# Patient Record
Sex: MALE | Race: WHITE | NOT HISPANIC OR LATINO | Employment: UNEMPLOYED | URBAN - METROPOLITAN AREA
[De-identification: names, ages, dates, MRNs, and addresses within clinical notes are randomized per-mention and may not be internally consistent; named-entity substitution may affect disease eponyms.]

---
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
## 2025-05-13 ENCOUNTER — OFFICE VISIT (OUTPATIENT)
Dept: INTERNAL MEDICINE | Facility: CLINIC | Age: 20
End: 2025-05-13
Payer: OTHER GOVERNMENT

## 2025-05-13 VITALS
OXYGEN SATURATION: 98 % | WEIGHT: 231.8 LBS | SYSTOLIC BLOOD PRESSURE: 130 MMHG | RESPIRATION RATE: 12 BRPM | DIASTOLIC BLOOD PRESSURE: 70 MMHG | HEART RATE: 95 BPM | TEMPERATURE: 98 F | HEIGHT: 75 IN | BODY MASS INDEX: 28.82 KG/M2

## 2025-05-13 DIAGNOSIS — Z13.220 LIPID SCREENING: ICD-10-CM

## 2025-05-13 DIAGNOSIS — Z23 NEED FOR VACCINATION: ICD-10-CM

## 2025-05-13 DIAGNOSIS — R19.4 ALTERED BOWEL HABITS: ICD-10-CM

## 2025-05-13 DIAGNOSIS — R19.7 INTERMITTENT DIARRHEA: ICD-10-CM

## 2025-05-13 DIAGNOSIS — R10.9 ABDOMINAL CRAMPING: ICD-10-CM

## 2025-05-13 DIAGNOSIS — I87.8 PELVIC PHLEBOLITHIASIS: ICD-10-CM

## 2025-05-13 DIAGNOSIS — Z13.29 THYROID DISORDER SCREEN: ICD-10-CM

## 2025-05-13 DIAGNOSIS — Z00.00 ANNUAL PHYSICAL EXAM: Primary | ICD-10-CM

## 2025-05-13 DIAGNOSIS — K59.09 INTERMITTENT CONSTIPATION: ICD-10-CM

## 2025-05-13 DIAGNOSIS — Z11.59 ENCOUNTER FOR HEPATITIS C SCREENING TEST FOR LOW RISK PATIENT: ICD-10-CM

## 2025-05-13 LAB
ALBUMIN SERPL-MCNC: 4.6 G/DL (ref 3.5–5.2)
ALBUMIN/GLOB SERPL: 1.8 G/DL
ALP SERPL-CCNC: 95 U/L (ref 39–117)
ALT SERPL W P-5'-P-CCNC: 145 U/L (ref 1–41)
AMYLASE SERPL-CCNC: 33 U/L (ref 28–100)
ANION GAP SERPL CALCULATED.3IONS-SCNC: 9.4 MMOL/L (ref 5–15)
AST SERPL-CCNC: 156 U/L (ref 1–40)
BASOPHILS # BLD AUTO: 0.02 10*3/MM3 (ref 0–0.2)
BASOPHILS NFR BLD AUTO: 0.4 % (ref 0–1.5)
BILIRUB SERPL-MCNC: 0.7 MG/DL (ref 0–1.2)
BUN SERPL-MCNC: 15 MG/DL (ref 6–20)
BUN/CREAT SERPL: 16.1 (ref 7–25)
CALCIUM SPEC-SCNC: 9.1 MG/DL (ref 8.6–10.5)
CHLORIDE SERPL-SCNC: 101 MMOL/L (ref 98–107)
CHOLEST SERPL-MCNC: 97 MG/DL (ref 0–200)
CO2 SERPL-SCNC: 24.6 MMOL/L (ref 22–29)
CREAT SERPL-MCNC: 0.93 MG/DL (ref 0.76–1.27)
DEPRECATED RDW RBC AUTO: 38.1 FL (ref 37–54)
EGFRCR SERPLBLD CKD-EPI 2021: 121.3 ML/MIN/1.73
EOSINOPHIL # BLD AUTO: 0.08 10*3/MM3 (ref 0–0.4)
EOSINOPHIL NFR BLD AUTO: 1.7 % (ref 0.3–6.2)
ERYTHROCYTE [DISTWIDTH] IN BLOOD BY AUTOMATED COUNT: 12.6 % (ref 12.3–15.4)
GLOBULIN UR ELPH-MCNC: 2.6 GM/DL
GLUCOSE SERPL-MCNC: 88 MG/DL (ref 65–99)
HCT VFR BLD AUTO: 42.9 % (ref 37.5–51)
HCV AB SER QL: NORMAL
HDLC SERPL-MCNC: 44 MG/DL (ref 40–60)
HGB BLD-MCNC: 14.8 G/DL (ref 13–17.7)
IMM GRANULOCYTES # BLD AUTO: 0.01 10*3/MM3 (ref 0–0.05)
IMM GRANULOCYTES NFR BLD AUTO: 0.2 % (ref 0–0.5)
LDLC SERPL CALC-MCNC: 43 MG/DL (ref 0–100)
LDLC/HDLC SERPL: 1.06 {RATIO}
LIPASE SERPL-CCNC: 26 U/L (ref 13–60)
LYMPHOCYTES # BLD AUTO: 1.36 10*3/MM3 (ref 0.7–3.1)
LYMPHOCYTES NFR BLD AUTO: 28.3 % (ref 19.6–45.3)
MCH RBC QN AUTO: 28.6 PG (ref 26.6–33)
MCHC RBC AUTO-ENTMCNC: 34.5 G/DL (ref 31.5–35.7)
MCV RBC AUTO: 83 FL (ref 79–97)
MONOCYTES # BLD AUTO: 0.54 10*3/MM3 (ref 0.1–0.9)
MONOCYTES NFR BLD AUTO: 11.3 % (ref 5–12)
NEUTROPHILS NFR BLD AUTO: 2.79 10*3/MM3 (ref 1.7–7)
NEUTROPHILS NFR BLD AUTO: 58.1 % (ref 42.7–76)
NRBC BLD AUTO-RTO: 0 /100 WBC (ref 0–0.2)
PLATELET # BLD AUTO: 282 10*3/MM3 (ref 140–450)
PMV BLD AUTO: 9.8 FL (ref 6–12)
POTASSIUM SERPL-SCNC: 4 MMOL/L (ref 3.5–5.2)
PROT SERPL-MCNC: 7.2 G/DL (ref 6–8.5)
RBC # BLD AUTO: 5.17 10*6/MM3 (ref 4.14–5.8)
SODIUM SERPL-SCNC: 135 MMOL/L (ref 136–145)
TRIGL SERPL-MCNC: 31 MG/DL (ref 0–150)
TSH SERPL DL<=0.05 MIU/L-ACNC: 2.87 UIU/ML (ref 0.27–4.2)
VLDLC SERPL-MCNC: 10 MG/DL (ref 5–40)
WBC NRBC COR # BLD AUTO: 4.8 10*3/MM3 (ref 3.4–10.8)

## 2025-05-13 PROCEDURE — 82150 ASSAY OF AMYLASE: CPT | Performed by: NURSE PRACTITIONER

## 2025-05-13 PROCEDURE — 80053 COMPREHEN METABOLIC PANEL: CPT | Performed by: NURSE PRACTITIONER

## 2025-05-13 PROCEDURE — 80061 LIPID PANEL: CPT | Performed by: NURSE PRACTITIONER

## 2025-05-13 PROCEDURE — 86803 HEPATITIS C AB TEST: CPT | Performed by: NURSE PRACTITIONER

## 2025-05-13 PROCEDURE — 83690 ASSAY OF LIPASE: CPT | Performed by: NURSE PRACTITIONER

## 2025-05-13 PROCEDURE — 84443 ASSAY THYROID STIM HORMONE: CPT | Performed by: NURSE PRACTITIONER

## 2025-05-13 PROCEDURE — 85025 COMPLETE CBC W/AUTO DIFF WBC: CPT | Performed by: NURSE PRACTITIONER

## 2025-05-13 NOTE — PROGRESS NOTES
"Chief Complaint  Abdominal Pain (Patient stated that he used to smoke and drink and every since then the abdominal pain has decreased but the pain is still there. Patient stated after they eat he has to use the restroom right away. Patient stated that they think that might have IBS)    Subjective        Dustin Gagnon presents to [facility] Pediatrics for checkup and some acute concerns.    Patient has not had a checkup since 2022.  He is here today primarily because of abdominal discomfort.  Patient reports that over the last several months to 1 year he has been experiencing some abdominal cramping, bubbling.  He has very altered bowels throughout the year, he can be normal, loose, watery, constipated, seems to be more stress related, concern for possible IBS.  He does report some intermittent abdominal discomfort, but it is really just a altered bowel habits.  It is affecting his job, if he is unable to make it to the bathroom, this gets worse and gets quite stressful for him.    Objective   Vital Signs:   /70 (BP Location: Left arm, Patient Position: Sitting, Cuff Size: Adult)   Pulse 95   Temp 98 °F (36.7 °C) (Temporal)   Resp 12   Ht 190.5 cm (75\")   Wt 105 kg (231 lb 12.8 oz)   SpO2 98%   BMI 28.97 kg/m²     Physical Exam  Vitals and nursing note reviewed.   Constitutional:       Appearance: Normal appearance.   HENT:      Head: Normocephalic and atraumatic.      Right Ear: External ear normal.      Left Ear: External ear normal.      Nose: Nose normal.      Mouth/Throat:      Mouth: Mucous membranes are moist.   Eyes:      Pupils: Pupils are equal, round, and reactive to light.   Cardiovascular:      Rate and Rhythm: Normal rate and regular rhythm.   Pulmonary:      Effort: Pulmonary effort is normal.      Breath sounds: Normal breath sounds.   Abdominal:      General: Bowel sounds are normal.   Neurological:      Mental Status: He is alert.   Psychiatric:       "   Mood and Affect: Mood normal.         Thought Content: Thought content normal.        Result Review :  {The following data was reviewed by ROBERT Copeland on 05/13/25            XR Abdomen KUB (In Office)  Result Date: 5/13/2025  Narrative: XR ABDOMEN KUB Date of Exam: 5/13/2025 9:07 AM EDT Indication: abd cramping Comparison: None available. Findings: 2 supine views of the abdomen demonstrates motion artifact in the upper abdomen view. No bowel obstruction is seen. Normal volume of stool. No acute osseous findings. No abnormal calcifications. Several pelvic calcifications may reflect phleboliths.     Impression: Impression: Nonobstructive bowel gas pattern. Several pelvic calcifications may reflect phleboliths. Electronically Signed: Jhonathan Arenas MD  5/13/2025 9:28 AM EDT  Workstation ID: [id number]        Diagnoses and all orders for this visit:    1. Annual physical exam (Primary)  Assessment & Plan:  Screening labs reviewed/ordered  Counseling provided regarding age appropriate screenings and immunizations, healthy diet and exercise.       Orders:  -     Comprehensive Metabolic Panel  -     CBC & Differential  -     TSH  -     Lipid Panel    2. Need for vaccination  -     HPV Vaccine    3. Lipid screening  -     Lipid Panel    4. Thyroid disorder screen  -     TSH    5. Abdominal cramping  -     Comprehensive Metabolic Panel  -     CBC & Differential  -     Lipase  -     Amylase  -     Ambulatory Referral to Gastroenterology  -     XR Abdomen KUB (In Office)  -     CT Abdomen Pelvis With & Without Contrast; Future    6. Intermittent diarrhea  -     Comprehensive Metabolic Panel  -     CBC & Differential  -     Amylase  -     Ambulatory Referral to Gastroenterology  -     XR Abdomen KUB (In Office)  -     CT Abdomen Pelvis With & Without Contrast; Future    7. Intermittent constipation  -     Comprehensive Metabolic Panel  -     CBC & Differential  -     Amylase  -     Ambulatory Referral to  Gastroenterology  -     XR Abdomen KUB (In Office)  -     CT Abdomen Pelvis With & Without Contrast; Future    8. Encounter for hepatitis C screening test for low risk patient  -     Hepatitis C Antibody    9. Pelvic phlebolithiasis  -     CT Abdomen Pelvis With & Without Contrast; Future    10. Altered bowel habits  -     CT Abdomen Pelvis With & Without Contrast; Future          Follow Up   No follow-ups on file.  Patient was given instructions and counseling regarding his condition or for health maintenance advice. Please see specific information pulled into the AVS if appropriate.     Gaetano Fairchild, ROBERT  5/13/2025  This note was electronically signed.

## 2025-05-13 NOTE — LETTER
[facility]  Vaccine Consent Form    Patient Name:  Dustin Infanteerling  Patient :  2005     Vaccine(s) Ordered    HPV Vaccine        Screening Checklist  The following questions should be completed prior to vaccination. If you answer “yes” to any question, it does not necessarily mean you should not be vaccinated. It just means we may need to clarify or ask more questions. If a question is unclear, please ask your healthcare provider to explain it.    Yes No   Any fever or moderate to severe illness today (mild illness and/or antibiotic treatment are not contraindications)?     Do you have a history of a serious reaction to any previous vaccinations, such as anaphylaxis, encephalopathy within 7 days, Guillain-[place] syndrome within 6 weeks, seizure?     Have you received any live vaccine(s) (e.g MMR, ROMI) or any other vaccines in the last month (to ensure duplicate doses aren't given)?     Do you have an anaphylactic allergy to latex (DTaP, DTaP-IPV, Hep A, Hep B, MenB, RV, Td, Tdap), baker’s yeast (Hep B, HPV), polysorbates (RSV, nirsevimab, PCV 20, Rotavirrus, Tdap, Shingrix), or gelatin (ROMI, MMR)?     Do you have an anaphylactic allergy to neomycin (Rabies, ROMI, MMR, IPV, Hep A), polymyxin B (IPV), or streptomycin (IPV)?      Any cancer, leukemia, AIDS, or other immune system disorder? (ROMI, MMR, RV)     Do you have a parent, brother, or sister with an immune system problem (if immune competence of vaccine recipient clinically verified, can proceed)? (MMR, ROMI)     Any recent steroid treatments for >2 weeks, chemotherapy, or radiation treatment? (ROMI, MMR)     Have you received antibody-containing blood transfusions or IVIG in the past 11 months (recommended interval is dependent on product)? (MMR, ROMI)     Have you taken antiviral drugs (acyclovir, famciclovir, valacyclovir for ROMI) in the last 24 or 48 hours, respectively?      Are you pregnant or planning to become pregnant within 1  "month? (ROMI, MMR, HPV, IPV, MenB, Abrexvy; For Hep B- refer to Engerix-B; For RSV - Abrysvo is indicated for 32-36 weeks of pregnancy from September to January)     For infants, have you ever been told your child has had intussusception or a medical emergency involving obstruction of the intestine (Rotavirus)? If not for an infant, can skip this question.         *Ordering Physicians/APC should be consulted if \"yes\" is checked by the patient or guardian above.  I have received, read, and understand the Vaccine Information Statement (VIS) for each vaccine ordered.  I have considered my or my child's health status as well as the health status of my close contacts.  I have taken the opportunity to discuss my vaccine questions with my or my child's health care provider.   I have requested that the ordered vaccine(s) be given to me or my child.  I understand the benefits and risks of the vaccines.  I understand that I should remain in the clinic for 15 minutes after receiving the vaccine(s).  _________________________________________________________  Signature of Patient or Parent/Legal Guardian ____________________  Date     "

## 2025-05-15 ENCOUNTER — OFFICE VISIT (OUTPATIENT)
Dept: GASTROENTEROLOGY | Facility: CLINIC | Age: 20
End: 2025-05-15
Payer: OTHER GOVERNMENT

## 2025-05-15 VITALS
SYSTOLIC BLOOD PRESSURE: 146 MMHG | BODY MASS INDEX: 28.85 KG/M2 | DIASTOLIC BLOOD PRESSURE: 60 MMHG | HEIGHT: 75 IN | OXYGEN SATURATION: 100 % | HEART RATE: 60 BPM | WEIGHT: 232 LBS

## 2025-05-15 DIAGNOSIS — R19.7 DIARRHEA, UNSPECIFIED TYPE: ICD-10-CM

## 2025-05-15 DIAGNOSIS — R79.89 ELEVATED LIVER FUNCTION TESTS: ICD-10-CM

## 2025-05-15 DIAGNOSIS — F10.90 ALCOHOL USE: ICD-10-CM

## 2025-05-15 DIAGNOSIS — K59.00 CONSTIPATION, UNSPECIFIED CONSTIPATION TYPE: ICD-10-CM

## 2025-05-15 DIAGNOSIS — R19.4 ALTERED BOWEL HABITS: Primary | ICD-10-CM

## 2025-05-15 NOTE — PROGRESS NOTES
Chief Complaint  Constipation and Diarrhea    Dustin Gagnon is a 19 y.o. male who presents to [facility]- [facility] on referral from No ref. provider found for a gastroenterology evaluation of altered bowel habits.      History of Present Illness  New patient presents to the office for elevated liver enzymes and altered bowel habits.  Patient reports a history of binge drinking in excess specifically on the weekends.  Patient feels that alcohol consumption in the last couple months has been excessive.  Last drink of alcohol was 5/10/2025.  During this timeframe he also had noticed change in bowel habits to alternate between constipation and diarrhea.  Denies melena and hematochezia.  Since stopping alcohol and increasing activity and water consumption he has had better regulation of bowel habits.      Past Medical History:   Diagnosis Date    ADHD (attention deficit hyperactivity disorder)        Past Surgical History:   Procedure Laterality Date    EAR TUBES         No current outpatient medications on file.     No Known Allergies    Family History   Problem Relation Age of Onset    Cancer Neg Hx     Diabetes Neg Hx     Hearing loss Neg Hx     Stroke Neg Hx     Colon cancer Neg Hx         Social History     Social History Narrative    Not on file       Immunization:  Immunization History   Administered Date(s) Administered    DTaP 2005, 2005, 2005, 01/29/2007, 07/08/2009    Fluzone (or Fluarix & Flulaval for VFC) >6mos 10/11/2022    Hep A, 2 Dose 05/07/2012, 12/28/2012    Hep B, Adolescent or Pediatric 2005, 2005, 2005    Hib (PRP-T) 2005, 2005, 2005, 06/27/2006    Hpv9 10/15/2019, 05/13/2025    IPV 2005, 2005, 2005, 07/08/2009    Influenza TIV (IM) 10/15/2019    MMR 06/27/2006, 07/08/2009    Meningococcal MCV4P (Menactra) 07/07/2016    Pneumococcal Conjugate 13-Valent (PCV13) 2005,  "2005, 2005, 09/27/2006    Tdap 07/07/2016, 11/29/2023    Varicella 06/27/2006, 07/08/2009        Objective     Vital Signs:   /60 (BP Location: Left arm, Patient Position: Sitting, Cuff Size: Adult)   Pulse 60   Ht 190.5 cm (75\")   Wt 105 kg (232 lb)   SpO2 100%   BMI 29.00 kg/m²       Physical Exam  Constitutional:       Appearance: Normal appearance. He is normal weight.   HENT:      Head: Normocephalic and atraumatic.      Nose: Nose normal.   Pulmonary:      Effort: Pulmonary effort is normal.   Skin:     General: Skin is warm and dry.   Neurological:      Mental Status: He is alert and oriented to person, place, and time. Mental status is at baseline.   Psychiatric:         Mood and Affect: Mood normal.         Behavior: Behavior normal.         Thought Content: Thought content normal.         Judgment: Judgment normal.         Result Review :     CBC w/diff          5/13/2025    09:23   CBC w/Diff   WBC 4.80    RBC 5.17    Hemoglobin 14.8    Hematocrit 42.9    MCV 83.0    MCH 28.6    MCHC 34.5    RDW 12.6    Platelets 282    Neutrophil Rel % 58.1    Immature Granulocyte Rel % 0.2    Lymphocyte Rel % 28.3    Monocyte Rel % 11.3    Eosinophil Rel % 1.7    Basophil Rel % 0.4      CMP          5/13/2025    09:23   CMP   Glucose 88    BUN 15    Creatinine 0.93    EGFR 121.3    Sodium 135    Potassium 4.0    Chloride 101    Calcium 9.1    Total Protein 7.2    Albumin 4.6    Globulin 2.6    Total Bilirubin 0.7    Alkaline Phosphatase 95    AST (SGOT) 156    ALT (SGPT) 145    Albumin/Globulin Ratio 1.8    BUN/Creatinine Ratio 16.1    Anion Gap 9.4        Lipase   Lipase   Date Value Ref Range Status   05/13/2025 26 13 - 60 U/L Final     Amylase   Amylase   Date Value Ref Range Status   05/13/2025 33 28 - 100 U/L Final     Iron Profile No results found for: \"IRON\", \"TIBC\", \"LABIRON\", \"TRANSFERRIN\"  Ferritin No results found for: \"FERRITIN\"        Assessment and Plan    Diagnoses and all orders " for this visit:    1. Altered bowel habits (Primary)    2. Constipation, unspecified constipation type    3. Diarrhea, unspecified type    4. Elevated liver function tests  -     Hepatic Function Panel; Future  -     Protime-INR; Future    5. Alcohol use    Scheduling number provided to patient to proceed with CT scan ordered by PCP.  Plan for repeat liver enzymes in 1 week-if still elevated will consider full liver workup  Patient's GI symptoms seem to be improving with cessation of alcohol as well as increased activity.  Plan to defer colonoscopy at this time for evaluation since symptoms seem to be improving.  Patient will follow-up in about 3 months and if still struggling with bowel habits we will consider colonoscopy at that time.  Patient will call the office if symptoms return.    Follow Up   Return in about 3 months (around 8/15/2025).  Patient was given instructions and counseling regarding his condition or for health maintenance advice. Please see specific information pulled into the AVS if appropriate.

## 2025-05-22 ENCOUNTER — LAB (OUTPATIENT)
Dept: LAB | Facility: HOSPITAL | Age: 20
End: 2025-05-22
Payer: OTHER GOVERNMENT

## 2025-05-22 ENCOUNTER — HOSPITAL ENCOUNTER (OUTPATIENT)
Dept: CT IMAGING | Facility: HOSPITAL | Age: 20
Discharge: HOME | End: 2025-05-22
Payer: OTHER GOVERNMENT

## 2025-05-22 DIAGNOSIS — R10.9 ABDOMINAL CRAMPING: ICD-10-CM

## 2025-05-22 DIAGNOSIS — K59.09 INTERMITTENT CONSTIPATION: ICD-10-CM

## 2025-05-22 DIAGNOSIS — I87.8 PELVIC PHLEBOLITHIASIS: ICD-10-CM

## 2025-05-22 DIAGNOSIS — R19.4 ALTERED BOWEL HABITS: ICD-10-CM

## 2025-05-22 DIAGNOSIS — R79.89 ELEVATED LIVER FUNCTION TESTS: ICD-10-CM

## 2025-05-22 DIAGNOSIS — R19.7 INTERMITTENT DIARRHEA: ICD-10-CM

## 2025-05-22 LAB
ALBUMIN SERPL-MCNC: 4.8 G/DL (ref 3.5–5.2)
ALP SERPL-CCNC: 92 U/L (ref 39–117)
ALT SERPL W P-5'-P-CCNC: 27 U/L (ref 1–41)
AST SERPL-CCNC: 22 U/L (ref 1–40)
BILIRUB CONJ SERPL-MCNC: 0.3 MG/DL (ref 0–0.3)
BILIRUB INDIRECT SERPL-MCNC: 0.9 MG/DL
BILIRUB SERPL-MCNC: 1.2 MG/DL (ref 0–1.2)
INR PPP: 1.2 (ref 0.86–1.15)
PROT SERPL-MCNC: 7.3 G/DL (ref 6–8.5)
PROTHROMBIN TIME: 15.7 SECONDS (ref 11.8–14.9)

## 2025-05-22 PROCEDURE — 74177 CT ABD & PELVIS W/CONTRAST: CPT

## 2025-05-22 PROCEDURE — 25510000001 IOPAMIDOL PER 1 ML: Performed by: NURSE PRACTITIONER

## 2025-05-22 PROCEDURE — 36415 COLL VENOUS BLD VENIPUNCTURE: CPT

## 2025-05-22 PROCEDURE — 85610 PROTHROMBIN TIME: CPT

## 2025-05-22 PROCEDURE — 80076 HEPATIC FUNCTION PANEL: CPT

## 2025-05-22 RX ORDER — IOPAMIDOL 755 MG/ML
100 INJECTION, SOLUTION INTRAVASCULAR
Status: COMPLETED | OUTPATIENT
Start: 2025-05-22 | End: 2025-05-22

## 2025-05-22 RX ADMIN — IOPAMIDOL 100 ML: 755 INJECTION, SOLUTION INTRAVENOUS at 09:05

## 2025-08-15 ENCOUNTER — OFFICE VISIT (OUTPATIENT)
Dept: GASTROENTEROLOGY | Facility: CLINIC | Age: 20
End: 2025-08-15
Payer: OTHER GOVERNMENT

## 2025-08-15 VITALS
HEIGHT: 76 IN | DIASTOLIC BLOOD PRESSURE: 63 MMHG | WEIGHT: 225 LBS | OXYGEN SATURATION: 100 % | SYSTOLIC BLOOD PRESSURE: 145 MMHG | HEART RATE: 71 BPM | BODY MASS INDEX: 27.4 KG/M2

## 2025-08-15 DIAGNOSIS — R74.8 ELEVATED LIVER ENZYMES: Primary | ICD-10-CM

## 2025-08-15 PROCEDURE — 99213 OFFICE O/P EST LOW 20 MIN: CPT
